# Patient Record
Sex: MALE | Race: WHITE | NOT HISPANIC OR LATINO | Employment: FULL TIME | URBAN - METROPOLITAN AREA
[De-identification: names, ages, dates, MRNs, and addresses within clinical notes are randomized per-mention and may not be internally consistent; named-entity substitution may affect disease eponyms.]

---

## 2017-02-14 ENCOUNTER — HOSPITAL ENCOUNTER (EMERGENCY)
Facility: HOSPITAL | Age: 52
Discharge: HOME | End: 2017-02-14
Attending: EMERGENCY MEDICINE | Admitting: EMERGENCY MEDICINE

## 2017-02-14 VITALS
BODY MASS INDEX: 24.34 KG/M2 | WEIGHT: 170 LBS | RESPIRATION RATE: 16 BRPM | OXYGEN SATURATION: 98 % | HEIGHT: 70 IN | DIASTOLIC BLOOD PRESSURE: 79 MMHG | TEMPERATURE: 97.5 F | HEART RATE: 40 BPM | SYSTOLIC BLOOD PRESSURE: 137 MMHG

## 2017-02-14 DIAGNOSIS — R00.1 BRADYCARDIA: ICD-10-CM

## 2017-02-14 DIAGNOSIS — S61.211A LACERATION OF LEFT INDEX FINGER: Primary | ICD-10-CM

## 2017-02-14 DIAGNOSIS — R55 VASOVAGAL SYNCOPE: ICD-10-CM

## 2017-02-14 LAB
ALBUMIN SERPL-MCNC: 4.4 G/DL (ref 3.5–5.2)
ALBUMIN/GLOB SERPL: 1.5 G/DL
ALP SERPL-CCNC: 37 U/L (ref 39–117)
ALT SERPL W P-5'-P-CCNC: 18 U/L (ref 1–41)
ANION GAP SERPL CALCULATED.3IONS-SCNC: 14.3 MMOL/L
AST SERPL-CCNC: 23 U/L (ref 1–40)
BASOPHILS # BLD AUTO: 0.02 10*3/MM3 (ref 0–0.2)
BASOPHILS NFR BLD AUTO: 0.2 % (ref 0–1.5)
BILIRUB SERPL-MCNC: 0.4 MG/DL (ref 0.1–1.2)
BUN BLD-MCNC: 11 MG/DL (ref 6–20)
BUN/CREAT SERPL: 14.5 (ref 7–25)
CALCIUM SPEC-SCNC: 9.9 MG/DL (ref 8.6–10.5)
CHLORIDE SERPL-SCNC: 100 MMOL/L (ref 98–107)
CO2 SERPL-SCNC: 26.7 MMOL/L (ref 22–29)
CREAT BLD-MCNC: 0.76 MG/DL (ref 0.76–1.27)
DEPRECATED RDW RBC AUTO: 40.3 FL (ref 37–54)
EOSINOPHIL # BLD AUTO: 0.02 10*3/MM3 (ref 0–0.7)
EOSINOPHIL NFR BLD AUTO: 0.2 % (ref 0.3–6.2)
ERYTHROCYTE [DISTWIDTH] IN BLOOD BY AUTOMATED COUNT: 11.9 % (ref 11.5–14.5)
GFR SERPL CREATININE-BSD FRML MDRD: 108 ML/MIN/1.73
GLOBULIN UR ELPH-MCNC: 2.9 GM/DL
GLUCOSE BLD-MCNC: 129 MG/DL (ref 65–99)
HCT VFR BLD AUTO: 42.3 % (ref 40.4–52.2)
HGB BLD-MCNC: 14.6 G/DL (ref 13.7–17.6)
IMM GRANULOCYTES # BLD: 0 10*3/MM3 (ref 0–0.03)
IMM GRANULOCYTES NFR BLD: 0 % (ref 0–0.5)
LYMPHOCYTES # BLD AUTO: 0.73 10*3/MM3 (ref 0.9–4.8)
LYMPHOCYTES NFR BLD AUTO: 6.9 % (ref 19.6–45.3)
MCH RBC QN AUTO: 32.3 PG (ref 27–32.7)
MCHC RBC AUTO-ENTMCNC: 34.5 G/DL (ref 32.6–36.4)
MCV RBC AUTO: 93.6 FL (ref 79.8–96.2)
MONOCYTES # BLD AUTO: 0.66 10*3/MM3 (ref 0.2–1.2)
MONOCYTES NFR BLD AUTO: 6.2 % (ref 5–12)
NEUTROPHILS # BLD AUTO: 9.18 10*3/MM3 (ref 1.9–8.1)
NEUTROPHILS NFR BLD AUTO: 86.5 % (ref 42.7–76)
PLATELET # BLD AUTO: 237 10*3/MM3 (ref 140–500)
PMV BLD AUTO: 9.6 FL (ref 6–12)
POTASSIUM BLD-SCNC: 4.4 MMOL/L (ref 3.5–5.2)
PROT SERPL-MCNC: 7.3 G/DL (ref 6–8.5)
RBC # BLD AUTO: 4.52 10*6/MM3 (ref 4.6–6)
SODIUM BLD-SCNC: 141 MMOL/L (ref 136–145)
TROPONIN T SERPL-MCNC: <0.01 NG/ML (ref 0–0.03)
WBC NRBC COR # BLD: 10.61 10*3/MM3 (ref 4.5–10.7)

## 2017-02-14 PROCEDURE — 84484 ASSAY OF TROPONIN QUANT: CPT | Performed by: PHYSICIAN ASSISTANT

## 2017-02-14 PROCEDURE — 96361 HYDRATE IV INFUSION ADD-ON: CPT

## 2017-02-14 PROCEDURE — 93010 ELECTROCARDIOGRAM REPORT: CPT | Performed by: INTERNAL MEDICINE

## 2017-02-14 PROCEDURE — 80053 COMPREHEN METABOLIC PANEL: CPT | Performed by: PHYSICIAN ASSISTANT

## 2017-02-14 PROCEDURE — 99284 EMERGENCY DEPT VISIT MOD MDM: CPT

## 2017-02-14 PROCEDURE — 25010000002 ONDANSETRON PER 1 MG: Performed by: PHYSICIAN ASSISTANT

## 2017-02-14 PROCEDURE — 85025 COMPLETE CBC W/AUTO DIFF WBC: CPT | Performed by: PHYSICIAN ASSISTANT

## 2017-02-14 PROCEDURE — 93005 ELECTROCARDIOGRAM TRACING: CPT | Performed by: PHYSICIAN ASSISTANT

## 2017-02-14 PROCEDURE — 96374 THER/PROPH/DIAG INJ IV PUSH: CPT

## 2017-02-14 RX ORDER — ONDANSETRON 2 MG/ML
4 INJECTION INTRAMUSCULAR; INTRAVENOUS ONCE
Status: COMPLETED | OUTPATIENT
Start: 2017-02-14 | End: 2017-02-14

## 2017-02-14 RX ORDER — SODIUM CHLORIDE 0.9 % (FLUSH) 0.9 %
10 SYRINGE (ML) INJECTION AS NEEDED
Status: DISCONTINUED | OUTPATIENT
Start: 2017-02-14 | End: 2017-02-14 | Stop reason: HOSPADM

## 2017-02-14 RX ADMIN — ONDANSETRON 4 MG: 2 INJECTION INTRAMUSCULAR; INTRAVENOUS at 12:14

## 2017-02-14 RX ADMIN — SODIUM CHLORIDE 1000 ML: 9 INJECTION, SOLUTION INTRAVENOUS at 12:13

## 2017-02-14 NOTE — ED PROVIDER NOTES
" EMERGENCY DEPARTMENT ENCOUNTER    CHIEF COMPLAINT  Chief Complaint: Finger Laceration  History given by: Patient  History limited by: Nothing  Room Number: 03/03  PMD: No Known Provider      HPI:  Pt is a 51 y.o. male who presents via EMS s/p cutting his left index finger on a piece of machinery  at work 09:00 this morning. After sustaining the laceration, he was washing the cut and when he began to bandage his finger the pt became very dizzy and diaphoretic. He then laid down and had a brief syncopal episode. When he regained consciousness, the patient was very nauseated and he had a few episodes emesis PTA.  He denies any chest pain, SOA or palpitations since cutting his finger this morning. Pt had \"scratchy throat\" last week and thought he had a cold but this had since resolved. The patient's last tetanus shot was one year ago. No other complaints at this time.      Duration:  1-2 seconds  Onset: Sudden  Timing: Constant  Location: Left index finger  Radiation: None  Quality: Dull  Intensity/Severity: Moderate  Progression: No Change  Associated Symptoms: Laceration, diaphoresis, nausea, vomiting, syncope   Aggravating Factors: Palpation  Alleviating Factors: None  Previous Episodes: None  Treatment before arrival: None mentioned     PAST MEDICAL HISTORY  Active Ambulatory Problems     Diagnosis Date Noted   • No Active Ambulatory Problems     Resolved Ambulatory Problems     Diagnosis Date Noted   • No Resolved Ambulatory Problems     Past Medical History   Diagnosis Date   • Celiac disease        PAST SURGICAL HISTORY  History reviewed. No pertinent past surgical history.    FAMILY HISTORY  History reviewed. No pertinent family history.    SOCIAL HISTORY  Social History     Social History   • Marital status:      Spouse name: N/A   • Number of children: N/A   • Years of education: N/A     Occupational History   • Not on file.     Social History Main Topics   • Smoking status: Not on file   • Smokeless " tobacco: Not on file   • Alcohol use Not on file   • Drug use: Not on file   • Sexual activity: Not on file     Other Topics Concern   • Not on file     Social History Narrative   • No narrative on file       ALLERGIES  Review of patient's allergies indicates no known allergies.    REVIEW OF SYSTEMS  Review of Systems   Constitutional: Positive for diaphoresis. Negative for chills and fatigue.   HENT: Negative for congestion, rhinorrhea and sore throat.    Eyes: Negative for pain.   Respiratory: Negative for cough, shortness of breath and wheezing.    Cardiovascular: Negative for chest pain, palpitations and leg swelling.   Gastrointestinal: Positive for nausea and vomiting. Negative for abdominal pain and diarrhea.   Genitourinary: Negative for difficulty urinating, dysuria, flank pain and frequency.   Musculoskeletal: Negative for arthralgias, myalgias, neck pain and neck stiffness.   Skin: Negative for rash.        Laceration left index finger   Neurological: Positive for syncope. Negative for dizziness, speech difficulty, weakness, light-headedness, numbness and headaches.   Psychiatric/Behavioral: Negative.    All other systems reviewed and are negative.      PHYSICAL EXAM  ED Triage Vitals   Temp Heart Rate Resp BP SpO2   02/14/17 1148 02/14/17 1148 02/14/17 1148 02/14/17 1148 02/14/17 1148   97.5 °F (36.4 °C) 40 16 122/70 99 %      Temp src Heart Rate Source Patient Position BP Location FiO2 (%)   -- -- -- -- --              Physical Exam   Constitutional: He is oriented to person, place, and time and well-developed, well-nourished, and in no distress.   HENT:   Head: Normocephalic and atraumatic.   Eyes: EOM are normal. Pupils are equal, round, and reactive to light.   Neck: Normal range of motion. Neck supple.   Cardiovascular: Regular rhythm and normal heart sounds.  Bradycardia present.    Pulmonary/Chest: Effort normal and breath sounds normal. No respiratory distress.   Abdominal: Soft. There is no  tenderness. There is no rebound and no guarding.   Musculoskeletal: Normal range of motion. He exhibits no edema.   Neurological: He is alert and oriented to person, place, and time. He has normal sensation and normal strength.   NVID with good tendon function.    Skin: Skin is warm and dry.   Left index finger laceration over ulnar middle phalanx.      Psychiatric: Mood and affect normal.   Nursing note and vitals reviewed.      LAB RESULTS  Lab Results (last 24 hours)     Procedure Component Value Units Date/Time    CBC & Differential [[id number]] Collected:  02/14/17 1214    Specimen:  Blood Updated:  02/14/17 1229    Narrative:       The following orders were created for panel order CBC & Differential.  Procedure                               Abnormality         Status                     ---------                               -----------         ------                     CBC Auto Differential[[id number]]         Abnormal            Final result                 Please view results for these tests on the individual orders.    Comprehensive Metabolic Panel [[id number]]  (Abnormal) Collected:  02/14/17 1214    Specimen:  Blood Updated:  02/14/17 1252     Glucose 129 (H) mg/dL      BUN 11 mg/dL      Creatinine 0.76 mg/dL      Sodium 141 mmol/L      Potassium 4.4 mmol/L      Chloride 100 mmol/L      CO2 26.7 mmol/L      Calcium 9.9 mg/dL      Total Protein 7.3 g/dL      Albumin 4.40 g/dL      ALT (SGPT) 18 U/L      AST (SGOT) 23 U/L      Alkaline Phosphatase 37 (L) U/L      Total Bilirubin 0.4 mg/dL      eGFR Non African Amer 108 mL/min/1.73      Globulin 2.9 gm/dL      A/G Ratio 1.5 g/dL      BUN/Creatinine Ratio 14.5      Anion Gap 14.3 mmol/L     Troponin [[id number]]  (Normal) Collected:  02/14/17 1214    Specimen:  Blood Updated:  02/14/17 1252     Troponin T <0.010 ng/mL     Narrative:       Troponin T Reference Ranges:  Less than 0.03 ng/mL:    Negative for AMI  0.03 to 0.09 ng/mL:      Indeterminant for  AMI  Greater than 0.09 ng/mL: Positive for AMI    CBC Auto Differential [[id number]]  (Abnormal) Collected:  02/14/17 1214    Specimen:  Blood Updated:  02/14/17 1229     WBC 10.61 10*3/mm3      RBC 4.52 (L) 10*6/mm3      Hemoglobin 14.6 g/dL      Hematocrit 42.3 %      MCV 93.6 fL      MCH 32.3 pg      MCHC 34.5 g/dL      RDW 11.9 %      RDW-SD 40.3 fl      MPV 9.6 fL      Platelets 237 10*3/mm3      Neutrophil % 86.5 (H) %      Lymphocyte % 6.9 (L) %      Monocyte % 6.2 %      Eosinophil % 0.2 (L) %      Basophil % 0.2 %      Immature Grans % 0.0 %      Neutrophils, Absolute 9.18 (H) 10*3/mm3      Lymphocytes, Absolute 0.73 (L) 10*3/mm3      Monocytes, Absolute 0.66 10*3/mm3      Eosinophils, Absolute 0.02 10*3/mm3      Basophils, Absolute 0.02 10*3/mm3      Immature Grans, Absolute 0.00 10*3/mm3           I ordered the above labs and reviewed the results      PROCEDURES  Laceration Repair  Date/Time: 2/14/2017 2:29 PM  Performed by: KENJI RICKETTS  Authorized by: XIOMY WYNN   Consent: Verbal consent obtained.  Consent given by: patient  Patient understanding: patient states understanding of the procedure being performed  Patient consent: the patient's understanding of the procedure matches consent given  Patient identity confirmed: verbally with patient  Body area: upper extremity  Location details: left index finger  Laceration length: 1.5 cm  Foreign bodies: no foreign bodies  Tendon involvement: none  Nerve involvement: none  Vascular damage: no  Anesthesia: digital block    Anesthesia:  Anesthesia: digital block  Local Anesthetic: lidocaine 1% with epinephrine   Anesthetic total: 2 mL  Sedation:  Patient sedated: no    Preparation: Patient was prepped and draped in the usual sterile fashion.  Irrigation solution: saline  Irrigation method: syringe  Amount of cleaning: standard  Debridement: none  Degree of undermining: none  Skin closure: 5-0 nylon  Number of sutures: 5  Technique: running  Approximation:  close  Approximation difficulty: simple            PROGRESS AND CONSULTS  ED Course   Value Comment By Time   Troponin T: <0.010 (Reviewed) BERNABE Carter 02/14 1412     12:04  Ordered Labs and EKG for further evaluation. Also ordered Zofran for nausea. 1L fluid bolus for hydration.     12:49  Discussed case with  and he agrees with the treatment plan.     14:40  Rechecked patient and they are resting after the laceration repair. Discussed plan to discharge the patient home and recommended recommended suture removal in 10-14 days by [employer]. I explained that the patient should keep the area dry for the first 24 hours. Patient agrees with the plan and all questions were addressed.     Latest vital signs   BP- 136/86 HR- (!) 40 Temp- 97.5 °F (36.4 °C) O2 sat- 98%      MEDICAL DECISION MAKING  Results were reviewed/discussed with the patient and they were also made aware of online access. Pt also made aware that some labs, such as cultures, will not be resulted during ER visit and follow up with PMD is necessary.     MDM  Number of Diagnoses or Management Options  Bradycardia:   Laceration of left index finger:   Vasovagal syncope:   Diagnosis management comments: I suspect vasovagal syncope.  He is asymptomatic at this time.  He is able to ambulate well.  He is normally bradycardic at home.  Will have close follow-up.         Amount and/or Complexity of Data Reviewed  Clinical lab tests: reviewed and ordered  Tests in the medicine section of CPT®: ordered and reviewed    Patient Progress  Patient progress: stable         DIAGNOSIS  Final diagnoses:   Laceration of left index finger   Vasovagal syncope   Bradycardia       DISPOSITION  DISCHARGE    Patient discharged in stable condition.    Reviewed implications of results, diagnosis, meds, responsibility to follow up, warning signs and symptoms of possible worsening, potential complications and reasons to return to ER, including suture complications  or any further syncopal episodes.     Patient/Family voiced understanding of above instructions.    Discussed plan for discharge, as there is no emergent indication for admission.  Pt/family is agreeable and understands need for follow up and repeat testing.  Pt is aware that discharge does not mean that nothing is wrong but it indicates no emergency is present that requires admission and they must continue care with follow-up as given below or physician of their choice.     FOLLOW-UP  [facility]   [address] [address]   [facility] [address]  [phone]    for suture removal in 10-14 days    Reinier Blackburn MD  [address]  [facility] [address]  [phone]      call for follow-up on slow heart rate in 5-7 days         Medication List      Notice     No changes were made to your prescriptions during this visit.          Latest Documented Vital Signs:  As of 2:52 PM  BP- 136/86 HR- (!) 40 Temp- 97.5 °F (36.4 °C) O2 sat- 98%    --  Documentation assistance provided by marily Bronson for Jonel Reid PA-C.  Information recorded by the scribe was done at my direction and has been verified and validated by me.          Omar Bronson  02/14/17 1456       BERNABE Carter  02/14/17 [id number]

## 2017-02-14 NOTE — ED NOTES
CUT INDEX FINGER (LEFT) ON A PIECE OF METAL AT WORK. PT REPORTS SYNCOPAL EPISODE AFTER INJURING THE FINGER.      Vanna Jurado RN  02/14/17 0760

## 2017-02-14 NOTE — ED PROVIDER NOTES
50 y/o male presents to the ED with syncope after finger laceration. The pt states he cut his finger and while washing it became diaphoretic and light-headed before his syncopal episode.   The pt says he has had cold like sx onset 6 days ago that have improved.   He says he ran a 4 mile race 3 days ago.   He denies exertional SOA and CP. His of low heart rate usually in 40's at rest.   [facility] as they were unable to get a temperature and he had a low heart rate.   He works out regularly. He denies taking any medicine. Currently feeling better. He had some persistent weakness and some sweating after initial event . That has subsequently resolved.       EXAM:  Awake, alert, oriented x3 in NAD  Non-toxic appearing  CV: regular rhythm, bradycardic rate in upper 40's lower 50's  Lungs: CTAB, O2sats 98% on RA.  Laceration to the left index finger of the middle ulnar phalanx. NV intact.     EKG           EKG time: 1217  Rhythm/Rate: 37, sinus olivia  P waves and CO:   QRS, axis: narrow complex, normal axis   ST and T waves: nonspecific ST changes, Nonspecific T wave changes V1-V6.    Interpreted Contemporaneously by me, independently viewed  No prior for comparison      PLAN:  Check labs and EKG. LAC repair performed by BERNABE Reid. Pt received IVF in the ED for hydration.   Based on HPI, labs, and EKG I believe pt's sx are from vaso-vagal syncope after seeing blood from his finger laceration.   The pt states he is actively fit and exercises often. His EKG showed sinus bradycardia with normal rhythm.    I talked at length with patient and spouse and informed of the limitations of tests and very likely etiology of vasovagal syncope. i will give follow up with cardiology. They agree with discharge home and will return if any concerns. The patient was able to ambulate in Ed without problems.     Decision to d/c pt from the ED was discussed pending ambulation in the ED. Pt was instructed to f/u  with cardiologist, referral given. Pt understands and agrees with plan of care, all questions and concerns addressed.       I supervised care provided by the midlevel provider.    We have discussed this patient's history, physical exam, and treatment plan.   I have reviewed the note and personally saw and examined the patient and agree with the plan of care.    Entered by Rio Ortega acting as scribe for Dr. Cohen.      Rio Ortega  02/14/17 [id number]       Omar Cohen MD  02/14/17 5360